# Patient Record
Sex: FEMALE
[De-identification: names, ages, dates, MRNs, and addresses within clinical notes are randomized per-mention and may not be internally consistent; named-entity substitution may affect disease eponyms.]

---
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
## 2025-07-16 NOTE — TELEPHONE ENCOUNTER
RECORDS RECEIVED FROM: internal /ce    DATE RECEIVED: 11/17/21    NOTES (FOR ALL VISITS) STATUS DETAILS   OFFICE NOTES from referring provider Nadine Edwards   OFFICE NOTES from other specialist ce  MARIA FERNANDAJASMINA- 11.8.21 [place]   10.28.21 [place]   6.5.20 [place]      ED NOTES na    OPERATIVE REPORT  (thyroid, pituitary, adrenal, parathyroid) na    MEDICATION LIST internal     IMAGING      DEXASCAN na    MRI (BRAIN) na    XR (Chest) na    CT (HEAD/NECK/CHEST/ABDOMEN) na    NUCLEAR  na    ULTRASOUND (HEAD/NECK) na    LABS     DIABETES: HBGA1C, CREATININE, FASTING LIPIDS, MICROALBUMIN URINE, POTASSIUM, TSH, T4    THYROID: TSH, T4, CBC, THYRODLONULIN, TOTAL T3, FREE T4, CALCITONIN, CEA ce  HBGA1C- 10.28.21   Cbc -3.4.21   TSH- 3.12.20   VITAMIN D- 3.12.20                Diet, DASH/TLC:   Sodium & Cholesterol Restricted  Consistent Carbohydrate {No Snacks} (07-12-25 @ 18:17) [Active] Diet, DASH/TLC:   Sodium & Cholesterol Restricted  Consistent Carbohydrate {No Snacks} (07-12-25 @ 18:17) [Active] Diet, DASH/TLC:   Sodium & Cholesterol Restricted  Consistent Carbohydrate {No Snacks} (07-12-25 @ 18:17) [Active]